# Patient Record
Sex: MALE | Race: WHITE | NOT HISPANIC OR LATINO | URBAN - METROPOLITAN AREA
[De-identification: names, ages, dates, MRNs, and addresses within clinical notes are randomized per-mention and may not be internally consistent; named-entity substitution may affect disease eponyms.]

---

## 2018-01-18 ENCOUNTER — EMERGENCY (EMERGENCY)
Facility: HOSPITAL | Age: 28
LOS: 1 days | Discharge: HOME | End: 2018-01-18
Attending: EMERGENCY MEDICINE | Admitting: EMERGENCY MEDICINE
Payer: COMMERCIAL

## 2018-01-18 VITALS
HEART RATE: 120 BPM | DIASTOLIC BLOOD PRESSURE: 98 MMHG | SYSTOLIC BLOOD PRESSURE: 138 MMHG | RESPIRATION RATE: 18 BRPM | TEMPERATURE: 98 F | OXYGEN SATURATION: 97 %

## 2018-01-18 VITALS
OXYGEN SATURATION: 99 % | SYSTOLIC BLOOD PRESSURE: 133 MMHG | HEART RATE: 101 BPM | RESPIRATION RATE: 16 BRPM | DIASTOLIC BLOOD PRESSURE: 96 MMHG

## 2018-01-18 LAB
ALBUMIN SERPL ELPH-MCNC: 4.6 G/DL — SIGNIFICANT CHANGE UP (ref 3.3–5)
ALP SERPL-CCNC: 94 U/L — SIGNIFICANT CHANGE UP (ref 40–120)
ALT FLD-CCNC: 33 U/L — SIGNIFICANT CHANGE UP (ref 4–41)
AMPHET UR-MCNC: NEGATIVE — SIGNIFICANT CHANGE UP
APAP SERPL-MCNC: < 15 UG/ML — LOW (ref 15–25)
AST SERPL-CCNC: 36 U/L — SIGNIFICANT CHANGE UP (ref 4–40)
BARBITURATES MEASUREMENT: NEGATIVE — SIGNIFICANT CHANGE UP
BARBITURATES UR SCN-MCNC: NEGATIVE — SIGNIFICANT CHANGE UP
BASE EXCESS BLDV CALC-SCNC: 2.7 MMOL/L — SIGNIFICANT CHANGE UP
BASOPHILS # BLD AUTO: 0.04 K/UL — SIGNIFICANT CHANGE UP (ref 0–0.2)
BASOPHILS NFR BLD AUTO: 0.7 % — SIGNIFICANT CHANGE UP (ref 0–2)
BENZODIAZ SERPL-MCNC: NEGATIVE — SIGNIFICANT CHANGE UP
BENZODIAZ UR-MCNC: POSITIVE — SIGNIFICANT CHANGE UP
BILIRUB SERPL-MCNC: 0.3 MG/DL — SIGNIFICANT CHANGE UP (ref 0.2–1.2)
BLOOD GAS VENOUS - CREATININE: 0.73 MG/DL — SIGNIFICANT CHANGE UP (ref 0.5–1.3)
BUN SERPL-MCNC: 9 MG/DL — SIGNIFICANT CHANGE UP (ref 7–23)
CALCIUM SERPL-MCNC: 8.9 MG/DL — SIGNIFICANT CHANGE UP (ref 8.4–10.5)
CANNABINOIDS UR-MCNC: POSITIVE — SIGNIFICANT CHANGE UP
CHLORIDE BLDV-SCNC: 112 MMOL/L — HIGH (ref 96–108)
CHLORIDE SERPL-SCNC: 103 MMOL/L — SIGNIFICANT CHANGE UP (ref 98–107)
CO2 SERPL-SCNC: 21 MMOL/L — LOW (ref 22–31)
COCAINE METAB.OTHER UR-MCNC: NEGATIVE — SIGNIFICANT CHANGE UP
CREAT SERPL-MCNC: 0.71 MG/DL — SIGNIFICANT CHANGE UP (ref 0.5–1.3)
EOSINOPHIL # BLD AUTO: 0.23 K/UL — SIGNIFICANT CHANGE UP (ref 0–0.5)
EOSINOPHIL NFR BLD AUTO: 3.9 % — SIGNIFICANT CHANGE UP (ref 0–6)
ETHANOL BLD-MCNC: 272 MG/DL — HIGH
GAS PNL BLDV: 141 MMOL/L — SIGNIFICANT CHANGE UP (ref 136–146)
GLUCOSE BLDV-MCNC: 87 — SIGNIFICANT CHANGE UP (ref 70–99)
GLUCOSE SERPL-MCNC: 80 MG/DL — SIGNIFICANT CHANGE UP (ref 70–99)
HCO3 BLDV-SCNC: 25 MMOL/L — SIGNIFICANT CHANGE UP (ref 20–27)
HCT VFR BLD CALC: 47.9 % — SIGNIFICANT CHANGE UP (ref 39–50)
HCT VFR BLDV CALC: 51.8 % — HIGH (ref 39–51)
HGB BLD-MCNC: 16.6 G/DL — SIGNIFICANT CHANGE UP (ref 13–17)
HGB BLDV-MCNC: 16.9 G/DL — SIGNIFICANT CHANGE UP (ref 13–17)
IMM GRANULOCYTES # BLD AUTO: 0.01 # — SIGNIFICANT CHANGE UP
IMM GRANULOCYTES NFR BLD AUTO: 0.2 % — SIGNIFICANT CHANGE UP (ref 0–1.5)
LACTATE BLDV-MCNC: 3.8 MMOL/L — HIGH (ref 0.5–2)
LIDOCAIN IGE QN: 26.8 U/L — SIGNIFICANT CHANGE UP (ref 7–60)
LYMPHOCYTES # BLD AUTO: 2.52 K/UL — SIGNIFICANT CHANGE UP (ref 1–3.3)
LYMPHOCYTES # BLD AUTO: 42.8 % — SIGNIFICANT CHANGE UP (ref 13–44)
MAGNESIUM SERPL-MCNC: 2.1 MG/DL — SIGNIFICANT CHANGE UP (ref 1.6–2.6)
MCHC RBC-ENTMCNC: 32.9 PG — SIGNIFICANT CHANGE UP (ref 27–34)
MCHC RBC-ENTMCNC: 34.7 % — SIGNIFICANT CHANGE UP (ref 32–36)
MCV RBC AUTO: 94.9 FL — SIGNIFICANT CHANGE UP (ref 80–100)
METHADONE UR-MCNC: NEGATIVE — SIGNIFICANT CHANGE UP
MONOCYTES # BLD AUTO: 0.32 K/UL — SIGNIFICANT CHANGE UP (ref 0–0.9)
MONOCYTES NFR BLD AUTO: 5.4 % — SIGNIFICANT CHANGE UP (ref 2–14)
NEUTROPHILS # BLD AUTO: 2.77 K/UL — SIGNIFICANT CHANGE UP (ref 1.8–7.4)
NEUTROPHILS NFR BLD AUTO: 47 % — SIGNIFICANT CHANGE UP (ref 43–77)
NRBC # FLD: 0 — SIGNIFICANT CHANGE UP
OPIATES UR-MCNC: NEGATIVE — SIGNIFICANT CHANGE UP
OXYCODONE UR-MCNC: NEGATIVE — SIGNIFICANT CHANGE UP
PCO2 BLDV: 47 MMHG — SIGNIFICANT CHANGE UP (ref 41–51)
PCP UR-MCNC: NEGATIVE — SIGNIFICANT CHANGE UP
PH BLDV: 7.38 PH — SIGNIFICANT CHANGE UP (ref 7.32–7.43)
PHOSPHATE SERPL-MCNC: 2.9 MG/DL — SIGNIFICANT CHANGE UP (ref 2.5–4.5)
PLATELET # BLD AUTO: 265 K/UL — SIGNIFICANT CHANGE UP (ref 150–400)
PMV BLD: 9.1 FL — SIGNIFICANT CHANGE UP (ref 7–13)
PO2 BLDV: 45 MMHG — HIGH (ref 35–40)
POTASSIUM BLDV-SCNC: 3.9 MMOL/L — SIGNIFICANT CHANGE UP (ref 3.4–4.5)
POTASSIUM SERPL-MCNC: 4.2 MMOL/L — SIGNIFICANT CHANGE UP (ref 3.5–5.3)
POTASSIUM SERPL-SCNC: 4.2 MMOL/L — SIGNIFICANT CHANGE UP (ref 3.5–5.3)
PROT SERPL-MCNC: 8.2 G/DL — SIGNIFICANT CHANGE UP (ref 6–8.3)
RBC # BLD: 5.05 M/UL — SIGNIFICANT CHANGE UP (ref 4.2–5.8)
RBC # FLD: 12.7 % — SIGNIFICANT CHANGE UP (ref 10.3–14.5)
SALICYLATES SERPL-MCNC: < 5 MG/DL — LOW (ref 15–30)
SAO2 % BLDV: 73.9 % — SIGNIFICANT CHANGE UP (ref 60–85)
SODIUM SERPL-SCNC: 146 MMOL/L — HIGH (ref 135–145)
WBC # BLD: 5.89 K/UL — SIGNIFICANT CHANGE UP (ref 3.8–10.5)
WBC # FLD AUTO: 5.89 K/UL — SIGNIFICANT CHANGE UP (ref 3.8–10.5)

## 2018-01-18 PROCEDURE — 99285 EMERGENCY DEPT VISIT HI MDM: CPT

## 2018-01-18 RX ORDER — FAMOTIDINE 10 MG/ML
20 INJECTION INTRAVENOUS ONCE
Qty: 0 | Refills: 0 | Status: COMPLETED | OUTPATIENT
Start: 2018-01-18 | End: 2018-01-18

## 2018-01-18 RX ORDER — ONDANSETRON 8 MG/1
4 TABLET, FILM COATED ORAL ONCE
Qty: 0 | Refills: 0 | Status: COMPLETED | OUTPATIENT
Start: 2018-01-18 | End: 2018-01-18

## 2018-01-18 RX ORDER — SODIUM CHLORIDE 9 MG/ML
2000 INJECTION INTRAMUSCULAR; INTRAVENOUS; SUBCUTANEOUS ONCE
Qty: 0 | Refills: 0 | Status: COMPLETED | OUTPATIENT
Start: 2018-01-18 | End: 2018-01-18

## 2018-01-18 RX ADMIN — ONDANSETRON 4 MILLIGRAM(S): 8 TABLET, FILM COATED ORAL at 18:12

## 2018-01-18 RX ADMIN — Medication 2 MILLIGRAM(S): at 18:12

## 2018-01-18 RX ADMIN — SODIUM CHLORIDE 2000 MILLILITER(S): 9 INJECTION INTRAMUSCULAR; INTRAVENOUS; SUBCUTANEOUS at 18:12

## 2018-01-18 RX ADMIN — FAMOTIDINE 20 MILLIGRAM(S): 10 INJECTION INTRAVENOUS at 18:12

## 2018-01-18 NOTE — ED PROVIDER NOTE - OBJECTIVE STATEMENT
Hx alcohol abuse, has been drinking heavily for the past few weeks. Pt's mother notes that he had a period of sobriety after his last drinking stint, however he has been drinking beer and hard alcohol heavily again. He notes that he drinks to alleviate anxiety, and that he currently feels that he is nauseated and that his heart has been racing for the past two days. He has not been drinking water, and had two episodes of vomiting this morning. He notes that there was some blood in the vomit but not a significant amount-- he relates this is likely because he had a nose bleed earlier today. He denies other new symptoms of concern at this time. He has no medical allergies.

## 2018-01-18 NOTE — ED PROVIDER NOTE - ATTENDING CONTRIBUTION TO CARE
DR. MACE, ATTENDING MD-  I performed a face to face bedside interview with patient regarding history of present illness, review of symptoms and past medical history. I completed an independent physical exam.  I have discussed patient's plan of care with the resident.   Documentation as above in the note.    28 y/o male with h/o etoh abuse here with shakiness, nausea, vomiting.  Last drink this am.  Not interested in detox.  Denies f/c, ha, neck stiffness, cp, sob, cough, abd pain, dysuria, rash.  Afebrile, vs wnl, anxious appearing, dry mm, ctabil, s1s2 rrr no m/r/g, abd soft mild epig ttp no r/g, no cva tenderness b/l, no leg swelling b/l, no rash, mild tremor, no tongue fascic.  Alcoholic gastritis vs pancreatitis vs etoh w/d.  Obtain cbc, cmp, lipase, give ivf, pepcid, benzo, reassess, may need admission if sx of etoh w/d worsen.

## 2018-01-18 NOTE — ED PROVIDER NOTE - MEDICAL DECISION MAKING DETAILS
27yM hx of anxiety presenting with tachycardia and nausea in the setting of alcohol boudreaux with no recent alcohol intake since this morning at unknown time. Benzos and IVF for tx, basic labs and tox screens. F/u studies, reassess, dispo with detox information if stable, if unstable admisison.

## 2018-01-18 NOTE — ED PROVIDER NOTE - PLAN OF CARE
Go directly to a detox center for further care. Return here to the emergency department for any new symptoms of concern such as difficulties breathing, intractable vomiting or vomiting large volumes of blood, severe abdominal pain or other new issues of acute worry. It is recommended that you seek treatment at an outpatient detox center for further care. Return here to the emergency department for any new symptoms of concern such as difficulties breathing, intractable vomiting or vomiting large volumes of blood, severe abdominal pain or other new issues of acute worry.

## 2018-01-18 NOTE — ED PROVIDER NOTE - CARE PLAN
Principal Discharge DX:	ETOH abuse  Secondary Diagnosis:	Anxiety Principal Discharge DX:	ETOH abuse  Assessment and plan of treatment:	Go directly to a detox center for further care. Return here to the emergency department for any new symptoms of concern such as difficulties breathing, intractable vomiting or vomiting large volumes of blood, severe abdominal pain or other new issues of acute worry.  Secondary Diagnosis:	Anxiety Principal Discharge DX:	ETOH abuse  Assessment and plan of treatment:	It is recommended that you seek treatment at an outpatient detox center for further care. Return here to the emergency department for any new symptoms of concern such as difficulties breathing, intractable vomiting or vomiting large volumes of blood, severe abdominal pain or other new issues of acute worry.  Secondary Diagnosis:	Anxiety

## 2018-01-18 NOTE — ED PROVIDER NOTE - PROGRESS NOTE DETAILS
Pt has now received approximately 1.6L ivf, heart rate from 120s down to mid 90s by radial palp. Sitting upright on bed now, able to have conversation normally. Pt states he is not wanting to be admitted to a detox center. Appears lucid, insight is fair, judgement is fair. JÚNIOR Hogue, FREDDY PGY1

## 2018-01-18 NOTE — ED ADULT NURSE NOTE - OBJECTIVE STATEMENT
Pt w/hx of anxiety and reported 2-3 yrs ETOH abuse received to rm 22 aaox4 ambulatory c/o nausea vomiting and abdominal pain 2/2 ETOH abuse.  Pt reports hospitalization Tuesday night for ETOH, decreased PO intake and vomiting since.  Pt denies chets pain SOB fevers chills diarrhea cough.  CIWA as noted Pt p/w anxiety lacrimose otherwise in NAD breaths even unlabored skin warm dry intact 20 g IV access obtained at [facility] labs and meds as ordered.  WIll monitor.

## 2018-01-18 NOTE — ED PROVIDER NOTE - PHYSICAL EXAMINATION
Gen: NAD, curled in fetal position, minimally conversational but polite and cooperative  Eyes: PERRLA, EOMI   HENT: Normocephalic, atraumatic. External ears normal, no rhinorrhea, dry mucous membranes.   CV: tachycardic, regular rhythm, no M/R/G  Resp: CTAB, non-labored, speaking without difficulty on room air  Abd: soft, mild tenderness to palp infraumbilical region, non rigid, no guarding or rebound tenderness  Skin: dry, wwp   Neuro: AOx3, speech is fluent and appropriate  Psych: Mood anxious, affect euthymic

## 2018-01-18 NOTE — ED ADULT TRIAGE NOTE - CHIEF COMPLAINT QUOTE
[place] and was found passed out on [address] PD called family and family brought here. Pt has been drinking heavily and family worried about withdrawal. Calm and cooperative in triage. Last drink 1 hour ago. No tremors or active w/d symptoms at this time in triage.

## 2018-01-18 NOTE — ED PROVIDER NOTE - NS ED ROS FT
General: No fevers / chills  HENT: No ear pain, runny nose, or sore throat  Eyes: No visual changes  CP: No chest pain, + palpitations, no light headedness  Resp: No shortness of breath, no cough  GI: + abdominal pain, no diarrhea, no constipation, + nausea, + vomiting  : No urinary fz, dysuria, or hematuria  Neuro: No numbness, +HA  Endo: No hx of diabetes  Heme: No hx of easy bleeding or bruising